# Patient Record
Sex: MALE | Race: OTHER | HISPANIC OR LATINO | URBAN - METROPOLITAN AREA
[De-identification: names, ages, dates, MRNs, and addresses within clinical notes are randomized per-mention and may not be internally consistent; named-entity substitution may affect disease eponyms.]

---

## 2017-09-11 ENCOUNTER — EMERGENCY (EMERGENCY)
Facility: HOSPITAL | Age: 42
LOS: 1 days | Discharge: LEFT BEFORE DISCHARGE | End: 2017-09-11
Admitting: EMERGENCY MEDICINE

## 2017-09-11 VITALS
RESPIRATION RATE: 18 BRPM | DIASTOLIC BLOOD PRESSURE: 78 MMHG | SYSTOLIC BLOOD PRESSURE: 130 MMHG | TEMPERATURE: 98 F | OXYGEN SATURATION: 99 % | HEART RATE: 69 BPM

## 2017-09-11 NOTE — ED ADULT TRIAGE NOTE - CHIEF COMPLAINT QUOTE
pt reports itching rash to right calf and right wrist x 1 month. placed on mupirocon and bactrim. PMD told pt to see a dermatologist however came to ED. no angioedema, no swelling to face or neck no stridor.

## 2019-02-22 ENCOUNTER — EMERGENCY (EMERGENCY)
Facility: HOSPITAL | Age: 44
LOS: 1 days | Discharge: HOME | End: 2019-02-22
Attending: EMERGENCY MEDICINE | Admitting: EMERGENCY MEDICINE
Payer: OTHER MISCELLANEOUS

## 2019-02-22 VITALS
SYSTOLIC BLOOD PRESSURE: 115 MMHG | HEART RATE: 60 BPM | DIASTOLIC BLOOD PRESSURE: 76 MMHG | TEMPERATURE: 98 F | RESPIRATION RATE: 18 BRPM

## 2019-02-22 PROCEDURE — 99283 EMERGENCY DEPT VISIT LOW MDM: CPT

## 2019-02-22 NOTE — ED ADULT TRIAGE NOTE - TEMPERATURE IN CELSIUS (DEGREES C)
36.6 Spoke with ARIS from [facility], she has not had much luck in making things work for PT with [facility] and is feeling that [facility] may be a better fit    I have sent [facility] the information earlier in Feb  And sounded like they may be able to help her but got no response.    I did attempt to reach the [facility] team today to see where things are at, pt is also hard to connect with    Left a message for Manager Cindy at [phone]

## 2019-02-23 PROCEDURE — 73050 X-RAY EXAM OF SHOULDERS: CPT | Mod: 26

## 2019-02-23 PROCEDURE — 73030 X-RAY EXAM OF SHOULDER: CPT | Mod: 26,LT

## 2019-02-23 NOTE — ED PROVIDER NOTE - OBJECTIVE STATEMENT
44 yr old M c non contrib PMHx states that while at work yesterday he was pushing on something with his left arm when he felt a pop and noticed a new bulge at the distal aspect of his left clavicle.  a/w pain intially, pain is improving, and is able to range arm we4ll, but concerned about bump.  Declining pain meds.  No other injuiries and otherwise in [facility].

## 2019-02-23 NOTE — ED PROVIDER NOTE - PROGRESS NOTE DETAILS
plain films show separation of lt AC joint c/w rt  no significant elevation (grade 2)  will sling for next few days  rest  follow with orthopedics next week

## 2019-03-13 NOTE — ED PROVIDER NOTE - MUSCULOSKELETAL, MLM
Spine appears normal, range of motion is not limited, no muscle or joint tenderness except for tender bulge at distal aspect of clavicle. FROM, no sensorimotor deficits.
patient

## 2019-08-22 ENCOUNTER — EMERGENCY (EMERGENCY)
Facility: HOSPITAL | Age: 44
LOS: 1 days | Discharge: HOME | End: 2019-08-22
Attending: EMERGENCY MEDICINE | Admitting: EMERGENCY MEDICINE
Payer: COMMERCIAL

## 2019-08-22 VITALS
OXYGEN SATURATION: 100 % | DIASTOLIC BLOOD PRESSURE: 75 MMHG | TEMPERATURE: 97 F | HEART RATE: 84 BPM | RESPIRATION RATE: 16 BRPM | SYSTOLIC BLOOD PRESSURE: 132 MMHG

## 2019-08-22 VITALS
OXYGEN SATURATION: 98 % | RESPIRATION RATE: 16 BRPM | DIASTOLIC BLOOD PRESSURE: 69 MMHG | TEMPERATURE: 99 F | HEART RATE: 70 BPM | SYSTOLIC BLOOD PRESSURE: 131 MMHG

## 2019-08-22 PROCEDURE — 99284 EMERGENCY DEPT VISIT MOD MDM: CPT

## 2019-08-22 PROCEDURE — 73502 X-RAY EXAM HIP UNI 2-3 VIEWS: CPT | Mod: 26,RT

## 2019-08-22 RX ORDER — ACETAMINOPHEN 500 MG
650 TABLET ORAL ONCE
Refills: 0 | Status: COMPLETED | OUTPATIENT
Start: 2019-08-22 | End: 2019-08-22

## 2019-08-22 RX ORDER — KETOROLAC TROMETHAMINE 30 MG/ML
30 SYRINGE (ML) INJECTION ONCE
Refills: 0 | Status: DISCONTINUED | OUTPATIENT
Start: 2019-08-22 | End: 2019-08-22

## 2019-08-22 RX ORDER — IBUPROFEN 200 MG
1 TABLET ORAL
Qty: 20 | Refills: 0
Start: 2019-08-22

## 2019-08-22 RX ADMIN — Medication 30 MILLIGRAM(S): at 15:56

## 2019-08-22 RX ADMIN — Medication 650 MILLIGRAM(S): at 15:55

## 2019-08-22 NOTE — ED PROVIDER NOTE - CLINICAL SUMMARY MEDICAL DECISION MAKING FREE TEXT BOX
45 y/o M presents to ED with R hip pain, worse with walking and movement. Plan to r/o muscle strain , obtain x-ray to r/o fracture vs arthritis , and give pain medication.

## 2019-08-22 NOTE — ED PROVIDER NOTE - PHYSICAL EXAMINATION
Exam chaperone: ED Tech Raul , no hernia  , 2+ pulses in R groin  Neuro: negative straight leg raise

## 2019-08-22 NOTE — ED PROVIDER NOTE - NSFOLLOWUPINSTRUCTIONS_ED_ALL_ED_FT
Take motrin 600mg every 8 hours.  Avoid any strenuous activity.  Follow up with an orthopedic within 1-2 weeks.  Return to ED for any worsening symptoms.

## 2019-08-22 NOTE — ED PROVIDER NOTE - OBJECTIVE STATEMENT
43 y/o M presents to ED with R hip pain since 5 days. Pt states noticed pain after carrying wallet in back pocket. Pain non-traumatic. Pt reports seen 3 years ago for similar pain on L hip. Pain worsens with walking  and movement. No deformity.  PMH/PSH: negative  FH/SH: non-contributory, except as noted in the HPI  Allergies: No known drug allergies  Medications: No chronic home medications

## 2019-08-22 NOTE — ED ADULT TRIAGE NOTE - CHIEF COMPLAINT QUOTE
Pt states that he has been having pain to his right hip since Saturday.  Pt states that the pain gets worse when his wallet is in the back pocket of his pants.  Pt denies PMH, denies daily medications

## 2020-08-13 ENCOUNTER — EMERGENCY (EMERGENCY)
Facility: HOSPITAL | Age: 45
LOS: 1 days | Discharge: HOME | End: 2020-08-13
Attending: EMERGENCY MEDICINE | Admitting: EMERGENCY MEDICINE
Payer: COMMERCIAL

## 2020-08-13 VITALS
HEART RATE: 76 BPM | OXYGEN SATURATION: 100 % | SYSTOLIC BLOOD PRESSURE: 152 MMHG | DIASTOLIC BLOOD PRESSURE: 81 MMHG | TEMPERATURE: 98 F | RESPIRATION RATE: 17 BRPM

## 2020-08-13 PROCEDURE — 99283 EMERGENCY DEPT VISIT LOW MDM: CPT

## 2020-08-13 NOTE — ED PROVIDER NOTE - ATTENDING CONTRIBUTION TO CARE
Gon yo M a/w R eye discomfort and redness.  Noted to have gritty sensation with increased tearing.  Reports wearing eye protection at work as a .  Denies any vision changes, drainage, ear pain, pain with EOM, dizziness, lightheadedness, or headache.      GEN - NAD; well appearing; A+O x3; non-toxic appearing; EYES - R eye medially conjunctival injection; no uptake of fluorescein overlying cornea, mild uptake on medial sclera.  SIMON PERALES,     44 yo M a/w R eye discomfort, no corneal abrasion, likely conjunctivitis, will start erythromycin ointment and discharge with outpt ophtho follow up.

## 2020-08-13 NOTE — ED PROVIDER NOTE - NSFOLLOWUPINSTRUCTIONS_ED_ALL_ED_FT
Apply clean, warm, moist compresses to eye  Keep hands clean, do not rub the eye  Use erythromycin ointment 1 cm in right lower inner lid 2-3 times daily (do not drive after applying as vision may become blurry)  Follow up with ophthalmology within 3-5 days  Follow up with your PMD 2-3 days  Return to the ER for worsening symptoms

## 2020-08-13 NOTE — ED PROVIDER NOTE - CLINICAL SUMMARY MEDICAL DECISION MAKING FREE TEXT BOX
pt with mild conjunctival erythema to medial aspect of rt eye, no corneal abrasion on exam  -artificial tears, erythromycin ointment, optho f/u

## 2020-08-13 NOTE — ED PROVIDER NOTE - PATIENT PORTAL LINK FT
You can access the FollowMyHealth Patient Portal offered by [facility] by registering at the following website: [email or web address]. By joining [employer]’s FollowMyHealth portal, you will also be able to view your health information using other applications (apps) compatible with our system.

## 2020-08-13 NOTE — ED PROVIDER NOTE - OBJECTIVE STATEMENT
44 y/o M no PMH c/o right eye discomfort and redness when he woke up this morning. Pt states he feels a gritty sensation in his eye, notes he has been rubbing his eye a lot today. + mild tearing. Pt is a  but denies getting anything in his eyes as he wearing protective eye wear at work.  Denies fever, chills, change in vision, purulent drainage, cough, congestion, ear pain.

## 2020-08-14 PROBLEM — Z00.00 ENCOUNTER FOR PREVENTIVE HEALTH EXAMINATION: Status: ACTIVE | Noted: 2020-08-14

## 2020-08-17 ENCOUNTER — APPOINTMENT (OUTPATIENT)
Dept: OPHTHALMOLOGY | Facility: CLINIC | Age: 45
End: 2020-08-17

## 2021-02-03 ENCOUNTER — EMERGENCY (EMERGENCY)
Facility: HOSPITAL | Age: 46
LOS: 1 days | Discharge: HOME | End: 2021-02-03
Attending: EMERGENCY MEDICINE | Admitting: EMERGENCY MEDICINE
Payer: COMMERCIAL

## 2021-02-03 VITALS
HEART RATE: 80 BPM | DIASTOLIC BLOOD PRESSURE: 72 MMHG | OXYGEN SATURATION: 100 % | RESPIRATION RATE: 18 BRPM | SYSTOLIC BLOOD PRESSURE: 139 MMHG | TEMPERATURE: 97 F

## 2021-02-03 PROCEDURE — 99283 EMERGENCY DEPT VISIT LOW MDM: CPT

## 2021-02-03 RX ORDER — KETOROLAC TROMETHAMINE 30 MG/ML
15 SYRINGE (ML) INJECTION ONCE
Refills: 0 | Status: DISCONTINUED | OUTPATIENT
Start: 2021-02-03 | End: 2021-02-03

## 2021-02-03 RX ORDER — IBUPROFEN 200 MG
1 TABLET ORAL
Qty: 21 | Refills: 0
Start: 2021-02-03 | End: 2021-02-09

## 2021-02-03 RX ORDER — IBUPROFEN 200 MG
1 TABLET ORAL
Qty: 30 | Refills: 0
Start: 2021-02-03 | End: 2021-02-12

## 2021-02-03 RX ORDER — CYCLOBENZAPRINE HYDROCHLORIDE 10 MG/1
1 TABLET, FILM COATED ORAL
Qty: 7 | Refills: 0
Start: 2021-02-03 | End: 2021-02-09

## 2021-02-03 RX ORDER — CYCLOBENZAPRINE HYDROCHLORIDE 10 MG/1
1 TABLET, FILM COATED ORAL
Qty: 21 | Refills: 0
Start: 2021-02-03 | End: 2021-02-09

## 2021-02-03 RX ADMIN — Medication 15 MILLIGRAM(S): at 14:24

## 2021-02-03 NOTE — ED PROVIDER NOTE - PHYSICAL EXAMINATION
Lianne Benton MD  GENERAL: Patient awake alert NAD.  HEENT: NC/AT, Moist mucous membranes, PERRL, EOMI.  LUNGS: normal work of breathing  ABDOMEN: Soft, NT, ND, No rebound, guarding. No CVA tenderness.   EXT: No edema. No calf tenderness.   MSK: No spinal tenderness, no pain with movement, no deformities.  +L groin tenderness at site of quadriceps tendon. Full ROM, 5/5 strength, sensation intact   NEURO: A&Ox3. Moving all extremities.  SKIN: Warm and dry. No rash.  PSYCH: Normal affect.

## 2021-02-03 NOTE — ED PROVIDER NOTE - NS ED ROS FT
GENERAL: No fever, chills  EYES: no vision changes, no discharge.   ENT: no difficulty swallowing or speaking   CARDIAC: no chest pain/pressure, SOB, lower extremity swelling  PULMONARY: no cough, SOB  GI: no abdominal pain, n/v/d  : no dysuria  SKIN: no rashes  NEURO: no headache, lightheadedness, paresthesia  MSK: + L groin pain. No joint pain, myalgia, weakness.

## 2021-02-03 NOTE — ED PROVIDER NOTE - CLINICAL SUMMARY MEDICAL DECISION MAKING FREE TEXT BOX
Serenity: 45 yo M presenting with L groin pain. Physical exam consistent with tendonitis. Will give ibuprofen and dc home with pcp f/u

## 2021-02-03 NOTE — ED ADULT TRIAGE NOTE - CHIEF COMPLAINT QUOTE
PT C/O left thigh, left groin pain x 4 days/ Denies trauma, injury to area, testicular pain, dysuria, hematuria, PHX.

## 2021-02-03 NOTE — ED PROVIDER NOTE - OBJECTIVE STATEMENT
Pt is a 45 yo M w/ R sciatica presenting with L groin pain. pain started saturday, pt does not recall any trauma or workouts prior to onset. Does not radiate down leg, no testicular or penile pain or discharge, no abd pain, N/V/D, no fevers, no numbness, no urinary or fecal incontinence, no spine pain. 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 Pt has been taking tylenol for pain. Walks with limp 2/2 pain

## 2021-02-03 NOTE — ED PROVIDER NOTE - PATIENT PORTAL LINK FT
You can access the FollowMyHealth Patient Portal offered by [facility] by registering at the following website: [email or web address]. By joining [employer]’s FollowMyHealth portal, you will also be able to view your health information using other applications (apps) compatible with our system.

## 2021-02-03 NOTE — ED PROVIDER NOTE - WET READ LAUNCH FT
FYI patient has R buttock wound, using their wound protocol on area  There are no Wet Read(s) to document.

## 2021-02-03 NOTE — ED PROVIDER NOTE - ATTENDING CONTRIBUTION TO CARE
Dr. Musa:  I have personally performed a face to face bedside history and physical examination of this patient. I have discussed the history, examination, review of systems, assessment and plan of management with the resident. I have reviewed the electronic medical record and amended it to reflect my history, review of systems, physical exam, assessment and plan.    46M h/o R sciatica presents c/o left groin pian x 2-3 days.  No acute trauma/injury.  Pain with standing up and walking.  ROS otherwise negative.    Exam:  - nad  - rrr   -ctab   -abd soft ntnd  - +TTP at the head of the quadriceps tendon, otherwise normal exam    A/P  - suspect tendonitis or other soft tissue etiology  - recommend trial of NSAIDs  - f/u PCP

## 2021-02-03 NOTE — ED PROVIDER NOTE - NSFOLLOWUPINSTRUCTIONS_ED_ALL_ED_FT
You were seen in the ED for left groin pain.  Your presumptive diagnosis is tendonitis.     For pain, please take 650mg Tylenol every 6 hours as needed and 600mg ibuprofen every 8 hours as needed. Always take ibuprofen with food. You make take both Tylenol and ibuprofen as described above if one medication is not enough for your pain.    Take 1 tab cyclobenzaprine once a day.    You may benefit from physical therapy. Please follow up with your primary care doctor in 2-3 days. Return to the ED if you experience any worsening or new symptoms or any symptoms that concern you, including fevers, chills, shortness of breath, chest pain, inability to walk, numbness, weakness.
